# Patient Record
Sex: FEMALE | Race: WHITE | NOT HISPANIC OR LATINO | Employment: FULL TIME | URBAN - METROPOLITAN AREA
[De-identification: names, ages, dates, MRNs, and addresses within clinical notes are randomized per-mention and may not be internally consistent; named-entity substitution may affect disease eponyms.]

---

## 2017-02-20 ENCOUNTER — OFFICE VISIT (OUTPATIENT)
Dept: INTERNAL MEDICINE | Facility: CLINIC | Age: 62
End: 2017-02-20

## 2017-02-20 VITALS
WEIGHT: 137 LBS | BODY MASS INDEX: 22.02 KG/M2 | SYSTOLIC BLOOD PRESSURE: 130 MMHG | HEART RATE: 72 BPM | OXYGEN SATURATION: 97 % | DIASTOLIC BLOOD PRESSURE: 80 MMHG | TEMPERATURE: 97.6 F | RESPIRATION RATE: 16 BRPM | HEIGHT: 66 IN

## 2017-02-20 DIAGNOSIS — H60.333 ACUTE SWIMMER'S EAR OF BOTH SIDES: ICD-10-CM

## 2017-02-20 DIAGNOSIS — J32.9 CHRONIC SINUSITIS, UNSPECIFIED LOCATION: ICD-10-CM

## 2017-02-20 DIAGNOSIS — L21.9 SEBORRHEA: ICD-10-CM

## 2017-02-20 DIAGNOSIS — J30.9 ALLERGIC RHINITIS, UNSPECIFIED ALLERGIC RHINITIS TRIGGER, UNSPECIFIED RHINITIS SEASONALITY: Primary | ICD-10-CM

## 2017-02-20 DIAGNOSIS — L98.9 INFLAMMATORY DERMATOSIS: ICD-10-CM

## 2017-02-20 PROBLEM — Z00.00 PREVENTATIVE HEALTH CARE: Status: ACTIVE | Noted: 2017-02-20

## 2017-02-20 LAB
ALBUMIN SERPL-MCNC: 4.2 G/DL (ref 3.2–4.8)
ALBUMIN/GLOB SERPL: 1.4 G/DL (ref 1.5–2.5)
ALP SERPL-CCNC: 57 U/L (ref 25–100)
ALT SERPL W P-5'-P-CCNC: 43 U/L (ref 7–40)
ANION GAP SERPL CALCULATED.3IONS-SCNC: 9 MMOL/L (ref 3–11)
AST SERPL-CCNC: 66 U/L (ref 0–33)
BASOPHILS # BLD AUTO: 0.05 10*3/MM3 (ref 0–0.2)
BASOPHILS NFR BLD AUTO: 0.9 % (ref 0–1)
BILIRUB SERPL-MCNC: 0.3 MG/DL (ref 0.3–1.2)
BUN BLD-MCNC: 10 MG/DL (ref 9–23)
BUN/CREAT SERPL: 25 (ref 7–25)
CALCIUM SPEC-SCNC: 9.9 MG/DL (ref 8.7–10.4)
CHLORIDE SERPL-SCNC: 100 MMOL/L (ref 99–109)
CO2 SERPL-SCNC: 29 MMOL/L (ref 20–31)
CREAT BLD-MCNC: 0.4 MG/DL (ref 0.6–1.3)
CRP SERPL-MCNC: 2.1 MG/DL (ref 0–10)
DEPRECATED RDW RBC AUTO: 48.7 FL (ref 37–54)
EOSINOPHIL # BLD AUTO: 0.27 10*3/MM3 (ref 0.1–0.3)
EOSINOPHIL NFR BLD AUTO: 5.1 % (ref 0–3)
ERYTHROCYTE [DISTWIDTH] IN BLOOD BY AUTOMATED COUNT: 13 % (ref 11.3–14.5)
ERYTHROCYTE [SEDIMENTATION RATE] IN BLOOD: 23 MM/HR (ref 0–30)
GFR SERPL CREATININE-BSD FRML MDRD: >150 ML/MIN/1.73
GLOBULIN UR ELPH-MCNC: 2.9 GM/DL
GLUCOSE BLD-MCNC: 80 MG/DL (ref 70–100)
HCT VFR BLD AUTO: 41.4 % (ref 34.5–44)
HGB BLD-MCNC: 13.7 G/DL (ref 11.5–15.5)
IMM GRANULOCYTES # BLD: 0 10*3/MM3 (ref 0–0.03)
IMM GRANULOCYTES NFR BLD: 0 % (ref 0–0.6)
LYMPHOCYTES # BLD AUTO: 1.72 10*3/MM3 (ref 0.6–4.8)
LYMPHOCYTES NFR BLD AUTO: 32.6 % (ref 24–44)
MCH RBC QN AUTO: 34 PG (ref 27–31)
MCHC RBC AUTO-ENTMCNC: 33.1 G/DL (ref 32–36)
MCV RBC AUTO: 102.7 FL (ref 80–99)
MONOCYTES # BLD AUTO: 0.56 10*3/MM3 (ref 0–1)
MONOCYTES NFR BLD AUTO: 10.6 % (ref 0–12)
NEUTROPHILS # BLD AUTO: 2.67 10*3/MM3 (ref 1.5–8.3)
NEUTROPHILS NFR BLD AUTO: 50.8 % (ref 41–71)
PLATELET # BLD AUTO: 263 10*3/MM3 (ref 150–450)
PMV BLD AUTO: 9.4 FL (ref 6–12)
POTASSIUM BLD-SCNC: 4 MMOL/L (ref 3.5–5.5)
PROT SERPL-MCNC: 7.1 G/DL (ref 5.7–8.2)
RBC # BLD AUTO: 4.03 10*6/MM3 (ref 3.89–5.14)
SODIUM BLD-SCNC: 138 MMOL/L (ref 132–146)
WBC NRBC COR # BLD: 5.27 10*3/MM3 (ref 3.5–10.8)

## 2017-02-20 PROCEDURE — 83520 IMMUNOASSAY QUANT NOS NONAB: CPT | Performed by: INTERNAL MEDICINE

## 2017-02-20 PROCEDURE — 86256 FLUORESCENT ANTIBODY TITER: CPT | Performed by: INTERNAL MEDICINE

## 2017-02-20 PROCEDURE — 99214 OFFICE O/P EST MOD 30 MIN: CPT | Performed by: INTERNAL MEDICINE

## 2017-02-20 PROCEDURE — 85652 RBC SED RATE AUTOMATED: CPT | Performed by: INTERNAL MEDICINE

## 2017-02-20 PROCEDURE — 86140 C-REACTIVE PROTEIN: CPT | Performed by: INTERNAL MEDICINE

## 2017-02-20 PROCEDURE — 82785 ASSAY OF IGE: CPT | Performed by: INTERNAL MEDICINE

## 2017-02-20 PROCEDURE — 80053 COMPREHEN METABOLIC PANEL: CPT | Performed by: INTERNAL MEDICINE

## 2017-02-20 PROCEDURE — 85025 COMPLETE CBC W/AUTO DIFF WBC: CPT | Performed by: INTERNAL MEDICINE

## 2017-02-20 PROCEDURE — 36415 COLL VENOUS BLD VENIPUNCTURE: CPT | Performed by: INTERNAL MEDICINE

## 2017-02-20 RX ORDER — KETOTIFEN FUMARATE 0.35 MG/ML
1 SOLUTION/ DROPS OPHTHALMIC 2 TIMES DAILY
COMMUNITY

## 2017-02-20 NOTE — PROGRESS NOTES
"Subjective   Trista Veras is a 61 y.o. female.     History of Present Illness     The patient has had one month of recurring head congestion and cough and earache.  She took a short course of Zithromax and methylprednisolone in late January prior to a ski trip.  She significantly improved on a temporary basis.  Over the last week she has had increasing congestion and sore throat and ear drainage.  She was seen last May was similar presentation.  She has had a lifelong experience of upper respiratory allergies.  She has worked with Dr. Froilan De La Rosa at [facility] and was offered ear tubes.  She has used antihistamines and occasional Flonase.  She has made no substantial progress over the last year.     The following portions of the patient's history were reviewed and updated as appropriate: allergies, current medications, past family history, past medical history, past social history, past surgical history and problem list.    Review of Systems   Constitutional: Positive for fatigue. Negative for appetite change.   HENT: Positive for congestion, ear discharge and sinus pressure.    Respiratory: Negative for cough and shortness of breath.    Cardiovascular: Negative for chest pain and palpitations.   Gastrointestinal: Negative for abdominal distention and nausea.   Neurological: Negative for dizziness and light-headedness.       Objective   Blood pressure 130/80, pulse 72, temperature 97.6 °F (36.4 °C), temperature source Oral, resp. rate 16, height 66\" (167.6 cm), weight 137 lb (62.1 kg), SpO2 97 %.    Physical Exam   Constitutional: She is oriented to person, place, and time. She appears well-developed and well-nourished.   Appears in mild general distress   HENT:   Both ear canals are moderately swollen with debris and poor visibility.  Throat reveals mild erythema and edema and drainage.   Eyes: EOM are normal. Pupils are equal, round, and reactive to light.   Mild erythema of conjunctiva   Neck: Normal range " of motion. Neck supple. No JVD present.   Cardiovascular: Normal rate, regular rhythm and normal heart sounds.    Pulmonary/Chest: Effort normal and breath sounds normal. She has no wheezes. She has no rales.   Abdominal: Soft. Bowel sounds are normal. She exhibits no distension and no mass. There is no tenderness.   Lymphadenopathy:     She has no cervical adenopathy.   Neurological: She is alert and oriented to person, place, and time. She exhibits normal muscle tone. Coordination normal.   Skin: Skin is warm and dry.   There are moderate seborrheic dermatitis changes of the hairline and neck   Psychiatric: She has a normal mood and affect. Her behavior is normal. Judgment and thought content normal.   Nursing note and vitals reviewed.    Procedures  Assessment/Plan   Trista was seen today for allergies.    Diagnoses and all orders for this visit:    Allergic rhinitis, unspecified allergic rhinitis trigger, unspecified rhinitis seasonality  -     Cancel: IgE; Future  -     IgE    Acute swimmer's ear of both sides    Seborrhea  -     Cancel: Comprehensive Metabolic Panel  -     Comprehensive Metabolic Panel    Chronic sinusitis, unspecified location  -     Cancel: CBC & Differential  -     Cancel: C-reactive Protein  -     Cancel: Sedimentation Rate  -     Cancel: CT sinus wo contrast; Future  -     CBC & Differential  -     C-reactive Protein  -     Sedimentation Rate  -     CT sinus wo contrast; Future  -     CBC Auto Differential    Inflammatory dermatosis  -     Cancel: ANCA Panel; Future  -     ANCA Panel    Other orders  -     ciprofloxacin-hydrocortisone (CIPRO HC OTIC) 0.2-1 % otic suspension; Administer 3 drops into both ears 2 (Two) Times a Day.  -     hydrocortisone 2.5 % cream; Apply  topically 2 (Two) Times a Day.    The patient has active external otitis similar to May 2016.  I've asked him to proceed with CT of the sinuses to evaluate her chronic disease particularly in the mastoid sinuses.  With  his basic testing we will recontact the ENT for consultation.    She has significant chronic upper respiratory allergies.  She was referred to an allergist last May but apparently that visit never recurred.  We will reattempt a visit to the allergist for new testing and treatment plans.    The patient has an active dermatitis of the face and neck most consistent with severe seborrhea.  I've offered her 2.5% hydrocortisone for initial treatment.  We may need new dermatology consultation.    The patient's 3 months status post lumbar laminectomy and has had marked improvement in her low back pain.  Remarkably she is physically active now and was able to ski in recent weeks although she admitted to staying [place] and often resting.    Patient Instructions   1.  CT x-ray of sinuses - this week.    2.  Start Cipro eardrops - both sides - morning and night - at least 2 weeks.    3.  Start hydrocortisone cream 2-1/2% - to facial and neck skin rash - twice daily.    4.  Continue nasal saline and Flonase spray - twice daily - to both sides of nose.    5.  Speak to nurse by Friday - test results.    6.  Plan new visit - ENT and allergist.    7.  Return in 2 weeks - nonfasting checkup.    8.  Eosinophil count 5% indicates active allergies.    9.  AST and ALT mildly elevated suggesting fatty liver affect.      10. Other standard laboratory tests are acceptable and require no change in treatment.    11.  ANCA and IgE testing is pending at the time of dictation.    Electronically signed Solo Ramey M.D.2/22/2017 6:53 AM

## 2017-02-20 NOTE — PATIENT INSTRUCTIONS
1.  CT x-ray of sinuses - this week.    2.  Start Cipro eardrops - both sides - morning and night - at least 2 weeks.    3.  Start hydrocortisone cream 2-1/2% - to facial and neck skin rash - twice daily.    4.  Continue nasal saline and Flonase spray - twice daily - to both sides of nose.    5.  Speak to nurse by Friday - test results.    6.  Plan new visit - ENT and allergist.    7.  Return in 2 weeks - nonfasting checkup.

## 2017-02-22 LAB — TOTAL IGE SMQN RAST: 71 IU/ML (ref 0–100)

## 2017-02-23 LAB
C-ANCA TITR SER IF: NORMAL TITER
MYELOPEROXIDASE AB SER-ACNC: <9 U/ML (ref 0–9)
P-ANCA ATYPICAL TITR SER IF: NORMAL TITER
P-ANCA TITR SER IF: NORMAL TITER
PROTEINASE3 AB SER IA-ACNC: <3.5 U/ML (ref 0–3.5)

## 2017-02-27 ENCOUNTER — HOSPITAL ENCOUNTER (OUTPATIENT)
Dept: CT IMAGING | Facility: HOSPITAL | Age: 62
Discharge: HOME | End: 2017-02-27
Attending: INTERNAL MEDICINE | Admitting: INTERNAL MEDICINE

## 2017-02-27 DIAGNOSIS — J32.9 CHRONIC SINUSITIS, UNSPECIFIED LOCATION: ICD-10-CM

## 2017-02-27 PROCEDURE — 70486 CT MAXILLOFACIAL W/O DYE: CPT

## 2017-02-28 ENCOUNTER — TELEPHONE (OUTPATIENT)
Dept: INTERNAL MEDICINE | Facility: CLINIC | Age: 62
End: 2017-02-28

## 2017-02-28 NOTE — TELEPHONE ENCOUNTER
Msg left: per TGF CT scan shows mastoiditis and maxillary sinusitis; TGF will contact [facility] Dr De La Rosa for new consultation; labs ok except eos up indicating active allergies and AST/ALT mildly elevated suggesting fatty liver affect per TGF. Requested pt to call back to let us know how her ears are doing and to schedule 2 week OX.

## 2017-03-09 ENCOUNTER — TELEPHONE (OUTPATIENT)
Dept: INTERNAL MEDICINE | Facility: CLINIC | Age: 62
End: 2017-03-09

## 2017-03-09 NOTE — TELEPHONE ENCOUNTER
Called pt for status report - states still having trouble hearing; CT scan shows sinusitis and mild mastoid disease; pt states has seen Dr Froilan De La Rosa at  before and is favorable to see him; has appt Mon 3-13-17 c TGF - will discuss c TGF then

## 2017-03-13 ENCOUNTER — OFFICE VISIT (OUTPATIENT)
Dept: INTERNAL MEDICINE | Facility: CLINIC | Age: 62
End: 2017-03-13

## 2017-03-13 VITALS
WEIGHT: 137 LBS | HEART RATE: 68 BPM | TEMPERATURE: 97.7 F | RESPIRATION RATE: 16 BRPM | BODY MASS INDEX: 22.11 KG/M2 | SYSTOLIC BLOOD PRESSURE: 130 MMHG | OXYGEN SATURATION: 96 % | DIASTOLIC BLOOD PRESSURE: 84 MMHG

## 2017-03-13 DIAGNOSIS — H60.333 ACUTE SWIMMER'S EAR OF BOTH SIDES: Primary | ICD-10-CM

## 2017-03-13 DIAGNOSIS — J30.9 ALLERGIC RHINITIS, UNSPECIFIED ALLERGIC RHINITIS TRIGGER, UNSPECIFIED RHINITIS SEASONALITY: ICD-10-CM

## 2017-03-13 DIAGNOSIS — J32.9 CHRONIC SINUSITIS, UNSPECIFIED LOCATION: ICD-10-CM

## 2017-03-13 DIAGNOSIS — M25.559 ARTHRALGIA OF HIP, UNSPECIFIED LATERALITY: ICD-10-CM

## 2017-03-13 PROCEDURE — 96372 THER/PROPH/DIAG INJ SC/IM: CPT | Performed by: INTERNAL MEDICINE

## 2017-03-13 PROCEDURE — 99213 OFFICE O/P EST LOW 20 MIN: CPT | Performed by: INTERNAL MEDICINE

## 2017-03-13 RX ORDER — CIPROFLOXACIN 500 MG/1
500 TABLET, FILM COATED ORAL EVERY 12 HOURS
Qty: 14 TABLET | Refills: 0 | Status: SHIPPED | OUTPATIENT
Start: 2017-03-13

## 2017-03-13 RX ORDER — METHYLPREDNISOLONE ACETATE 80 MG/ML
80 INJECTION, SUSPENSION INTRA-ARTICULAR; INTRALESIONAL; INTRAMUSCULAR; SOFT TISSUE ONCE
Status: COMPLETED | OUTPATIENT
Start: 2017-03-13 | End: 2017-03-13

## 2017-03-13 RX ORDER — PREDNISONE 1 MG/1
15 TABLET ORAL DAILY
Qty: 42 TABLET | Refills: 0 | Status: SHIPPED | OUTPATIENT
Start: 2017-03-13

## 2017-03-13 RX ADMIN — METHYLPREDNISOLONE ACETATE 80 MG: 80 INJECTION, SUSPENSION INTRA-ARTICULAR; INTRALESIONAL; INTRAMUSCULAR; SOFT TISSUE at 17:15

## 2017-03-13 NOTE — PROGRESS NOTES
Subjective   Trista Veras is a 61 y.o. female.     History of Present Illness     She was seen February 20 with bilateral acute otitis externa.  Sinus CT showed mild fluid of the maxillary and mastoid sinuses.  She was given antibiotic eardrops and has made some progress.  She has had recurring head congestion drainage and ear pressure.  Her  gave her a Medrol Dosepak one month ago which caused temporary relief.  The patient has a long history of significant allergic rhinitis and recurring ear infections.      The following portions of the patient's history were reviewed and updated as appropriate: allergies, current medications, past family history, past medical history, past social history, past surgical history and problem list.    Review of Systems   Constitutional: Positive for fatigue. Negative for appetite change, chills and fever.   HENT: Positive for congestion, ear pain, hearing loss, sinus pressure and sore throat. Negative for trouble swallowing.    Respiratory: Negative for cough and stridor.    Cardiovascular: Negative for chest pain and palpitations.   Gastrointestinal: Negative for abdominal distention and nausea.   Neurological: Positive for headaches. Negative for dizziness and light-headedness.       Objective   Blood pressure 130/84, pulse 68, temperature 97.7 °F (36.5 °C), temperature source Oral, resp. rate 16, weight 137 lb (62.1 kg), SpO2 96 %.    Physical Exam   Constitutional: She is oriented to person, place, and time. She appears well-developed and well-nourished. No distress.   HENT:   Mouth/Throat: Oropharynx is clear and moist.   Both ear canals show moderate edema with canal narrowing.  There is minimal scaling and no exudate or drainage.  There is no facial or mastoid tenderness.   Eyes: Conjunctivae and EOM are normal. Pupils are equal, round, and reactive to light.   Neck: Normal range of motion. Neck supple. No JVD present.   Lymphadenopathy:     She has no  cervical adenopathy.   Neurological: She is alert and oriented to person, place, and time. Coordination normal.   Skin: Skin is warm and dry.   Psychiatric: She has a normal mood and affect. Her behavior is normal. Judgment and thought content normal.   Nursing note and vitals reviewed.    Procedures  Assessment/Plan   Trista was seen today for hearing loss.    Diagnoses and all orders for this visit:    Acute swimmer's ear of both sides    Allergic rhinitis, unspecified allergic rhinitis trigger, unspecified rhinitis seasonality  -     methylPREDNISolone acetate (DEPO-medrol) injection 80 mg; Inject 1 mL into the shoulder, thigh, or buttocks 1 (One) Time.    Chronic sinusitis, unspecified location    Arthralgia of hip, unspecified laterality    Other orders  -     ciprofloxacin (CIPRO) 500 MG tablet; Take 1 tablet by mouth Every 12 (Twelve) Hours.  -     predniSONE (DELTASONE) 5 MG tablet; Take 3 tablets by mouth Daily. For 7 days - 2 tablets daily for 7 days - 1 tablet daily for 7 days.    Patient has significant persistent otitis externa bilaterally.  There has been significant improvement in the last 2 weeks.  Because of failure of Cipro eardrops to clear this fully, we will give her a short course of oral Cipro prior to her ENT visit next week.    The patient has significant allergic rhinitis recurring congestion drainage.  She likely has significant eustachian tube dysfunction with high pressures in the middle ear.  We have given her a Depo-Medrol injection with short course of prednisone to improve her severe acute allergic rhinitis.  The CT scan indicates evidence of chronic allergic rhinitis but no active sinusitis.    Patient Instructions   1.  Start Cipro 500 mg every 12 hours - for 7 days.    2.  Tomorrow morning start prednisone 15 mg daily for 7 days - then 10 mg for 7 days - then 5 mg for 7 days.    3.  Continue other usual medicines and supplements - as listed.    4.  Obtain a CD disc of your  sinuses from x-ray - now.    5.  Follow-through with ENT consultation - next week.    6.  Return in one month -  fasting checkup.    7.  Fax this note and February note to Dr. De La Rosa, Department of otolaryngology, at [facility].  At [phone].  Include copy of February lab tests and the CT scan.    Electronically signed Solo Ramey M.D.3/14/2017 7:43 PM

## 2017-03-13 NOTE — PATIENT INSTRUCTIONS
1.  Start Cipro 500 mg every 12 hours - for 7 days.    2.  Tomorrow morning start prednisone 15 mg daily for 7 days - then 10 mg for 7 days - then 5 mg for 7 days.    3.  Continue other usual medicines and supplements - as listed.    4.  Obtain a CD disc of your sinuses from x-ray - now.    5.  Follow-through with ENT consultation - next week.    6.  Return in one month -  fasting checkup.

## 2017-03-17 ENCOUNTER — DOCUMENTATION (OUTPATIENT)
Dept: INTERNAL MEDICINE | Facility: CLINIC | Age: 62
End: 2017-03-17

## 2017-03-17 NOTE — PROGRESS NOTES
Telephone call this afternoon to Froilan De La Rosa M.D. ENT physician at [facility].  Have discussed her recent visits and findings.  Records have been sent to [facility].  Dr. De La Rosa will reevaluate next week.